# Patient Record
Sex: FEMALE | Race: OTHER | NOT HISPANIC OR LATINO | URBAN - METROPOLITAN AREA
[De-identification: names, ages, dates, MRNs, and addresses within clinical notes are randomized per-mention and may not be internally consistent; named-entity substitution may affect disease eponyms.]

---

## 2020-11-10 ENCOUNTER — EMERGENCY (EMERGENCY)
Facility: HOSPITAL | Age: 20
LOS: 1 days | Discharge: OTHER INSTITUTION | End: 2020-11-10
Attending: EMERGENCY MEDICINE
Payer: COMMERCIAL

## 2020-11-10 VITALS
SYSTOLIC BLOOD PRESSURE: 118 MMHG | RESPIRATION RATE: 17 BRPM | WEIGHT: 130.07 LBS | OXYGEN SATURATION: 98 % | HEART RATE: 82 BPM | HEIGHT: 65 IN | TEMPERATURE: 99 F | DIASTOLIC BLOOD PRESSURE: 69 MMHG

## 2020-11-10 DIAGNOSIS — F12.10 CANNABIS ABUSE, UNCOMPLICATED: ICD-10-CM

## 2020-11-10 DIAGNOSIS — F32.9 MAJOR DEPRESSIVE DISORDER, SINGLE EPISODE, UNSPECIFIED: ICD-10-CM

## 2020-11-10 LAB
ALBUMIN SERPL ELPH-MCNC: 4.6 G/DL — SIGNIFICANT CHANGE UP (ref 3.3–5.2)
ALP SERPL-CCNC: 57 U/L — SIGNIFICANT CHANGE UP (ref 40–120)
ALT FLD-CCNC: 20 U/L — SIGNIFICANT CHANGE UP
AMPHET UR-MCNC: NEGATIVE — SIGNIFICANT CHANGE UP
ANION GAP SERPL CALC-SCNC: 13 MMOL/L — SIGNIFICANT CHANGE UP (ref 5–17)
ANISOCYTOSIS BLD QL: SLIGHT — SIGNIFICANT CHANGE UP
APPEARANCE UR: CLEAR — SIGNIFICANT CHANGE UP
AST SERPL-CCNC: 36 U/L — HIGH
BACTERIA # UR AUTO: ABNORMAL
BARBITURATES UR SCN-MCNC: NEGATIVE — SIGNIFICANT CHANGE UP
BASOPHILS # BLD AUTO: 0.05 K/UL — SIGNIFICANT CHANGE UP (ref 0–0.2)
BASOPHILS NFR BLD AUTO: 0.9 % — SIGNIFICANT CHANGE UP (ref 0–2)
BENZODIAZ UR-MCNC: NEGATIVE — SIGNIFICANT CHANGE UP
BILIRUB SERPL-MCNC: 0.6 MG/DL — SIGNIFICANT CHANGE UP (ref 0.4–2)
BILIRUB UR-MCNC: NEGATIVE — SIGNIFICANT CHANGE UP
BUN SERPL-MCNC: 10 MG/DL — SIGNIFICANT CHANGE UP (ref 8–20)
CALCIUM SERPL-MCNC: 9.8 MG/DL — SIGNIFICANT CHANGE UP (ref 8.6–10.2)
CHLORIDE SERPL-SCNC: 100 MMOL/L — SIGNIFICANT CHANGE UP (ref 98–107)
CO2 SERPL-SCNC: 26 MMOL/L — SIGNIFICANT CHANGE UP (ref 22–29)
COCAINE METAB.OTHER UR-MCNC: NEGATIVE — SIGNIFICANT CHANGE UP
COLOR SPEC: YELLOW — SIGNIFICANT CHANGE UP
CREAT SERPL-MCNC: 0.66 MG/DL — SIGNIFICANT CHANGE UP (ref 0.5–1.3)
DIFF PNL FLD: NEGATIVE — SIGNIFICANT CHANGE UP
EOSINOPHIL # BLD AUTO: 0.09 K/UL — SIGNIFICANT CHANGE UP (ref 0–0.5)
EOSINOPHIL NFR BLD AUTO: 1.7 % — SIGNIFICANT CHANGE UP (ref 0–6)
EPI CELLS # UR: SIGNIFICANT CHANGE UP
ETHANOL SERPL-MCNC: <10 MG/DL — HIGH (ref 0–9)
GIANT PLATELETS BLD QL SMEAR: PRESENT — SIGNIFICANT CHANGE UP
GLUCOSE SERPL-MCNC: 82 MG/DL — SIGNIFICANT CHANGE UP (ref 70–99)
GLUCOSE UR QL: NEGATIVE MG/DL — SIGNIFICANT CHANGE UP
HCG SERPL-ACNC: <4 MIU/ML — SIGNIFICANT CHANGE UP
HCT VFR BLD CALC: 41.8 % — SIGNIFICANT CHANGE UP (ref 34.5–45)
HGB BLD-MCNC: 14.5 G/DL — SIGNIFICANT CHANGE UP (ref 11.5–15.5)
KETONES UR-MCNC: ABNORMAL
LEUKOCYTE ESTERASE UR-ACNC: ABNORMAL
LYMPHOCYTES # BLD AUTO: 1.39 K/UL — SIGNIFICANT CHANGE UP (ref 1–3.3)
LYMPHOCYTES # BLD AUTO: 27.2 % — SIGNIFICANT CHANGE UP (ref 13–44)
MANUAL SMEAR VERIFICATION: SIGNIFICANT CHANGE UP
MCHC RBC-ENTMCNC: 27.6 PG — SIGNIFICANT CHANGE UP (ref 27–34)
MCHC RBC-ENTMCNC: 34.7 GM/DL — SIGNIFICANT CHANGE UP (ref 32–36)
MCV RBC AUTO: 79.6 FL — LOW (ref 80–100)
METHADONE UR-MCNC: NEGATIVE — SIGNIFICANT CHANGE UP
MICROCYTES BLD QL: SLIGHT — SIGNIFICANT CHANGE UP
MONOCYTES # BLD AUTO: 0.4 K/UL — SIGNIFICANT CHANGE UP (ref 0–0.9)
MONOCYTES NFR BLD AUTO: 7.9 % — SIGNIFICANT CHANGE UP (ref 2–14)
NEUTROPHILS # BLD AUTO: 2.95 K/UL — SIGNIFICANT CHANGE UP (ref 1.8–7.4)
NEUTROPHILS NFR BLD AUTO: 57.9 % — SIGNIFICANT CHANGE UP (ref 43–77)
NITRITE UR-MCNC: NEGATIVE — SIGNIFICANT CHANGE UP
OPIATES UR-MCNC: NEGATIVE — SIGNIFICANT CHANGE UP
OVALOCYTES BLD QL SMEAR: SLIGHT — SIGNIFICANT CHANGE UP
PCP SPEC-MCNC: SIGNIFICANT CHANGE UP
PCP UR-MCNC: NEGATIVE — SIGNIFICANT CHANGE UP
PH UR: 6.5 — SIGNIFICANT CHANGE UP (ref 5–8)
PLAT MORPH BLD: NORMAL — SIGNIFICANT CHANGE UP
PLATELET # BLD AUTO: 266 K/UL — SIGNIFICANT CHANGE UP (ref 150–400)
POLYCHROMASIA BLD QL SMEAR: SLIGHT — SIGNIFICANT CHANGE UP
POTASSIUM SERPL-MCNC: 4.2 MMOL/L — SIGNIFICANT CHANGE UP (ref 3.5–5.3)
POTASSIUM SERPL-SCNC: 4.2 MMOL/L — SIGNIFICANT CHANGE UP (ref 3.5–5.3)
PROT SERPL-MCNC: 8.7 G/DL — SIGNIFICANT CHANGE UP (ref 6.6–8.7)
PROT UR-MCNC: 100 MG/DL
RBC # BLD: 5.25 M/UL — HIGH (ref 3.8–5.2)
RBC # FLD: 13.4 % — SIGNIFICANT CHANGE UP (ref 10.3–14.5)
RBC BLD AUTO: ABNORMAL
RBC CASTS # UR COMP ASSIST: SIGNIFICANT CHANGE UP /HPF (ref 0–4)
SALICYLATES SERPL-MCNC: <0.6 MG/DL — LOW (ref 10–20)
SARS-COV-2 RNA SPEC QL NAA+PROBE: DETECTED
SODIUM SERPL-SCNC: 139 MMOL/L — SIGNIFICANT CHANGE UP (ref 135–145)
SP GR SPEC: 1.01 — SIGNIFICANT CHANGE UP (ref 1.01–1.02)
THC UR QL: POSITIVE
UROBILINOGEN FLD QL: 1 MG/DL
VARIANT LYMPHS # BLD: 4.4 % — SIGNIFICANT CHANGE UP (ref 0–6)
WBC # BLD: 5.1 K/UL — SIGNIFICANT CHANGE UP (ref 3.8–10.5)
WBC # FLD AUTO: 5.1 K/UL — SIGNIFICANT CHANGE UP (ref 3.8–10.5)
WBC UR QL: SIGNIFICANT CHANGE UP

## 2020-11-10 PROCEDURE — 80053 COMPREHEN METABOLIC PANEL: CPT

## 2020-11-10 PROCEDURE — 85025 COMPLETE CBC W/AUTO DIFF WBC: CPT

## 2020-11-10 PROCEDURE — 90792 PSYCH DIAG EVAL W/MED SRVCS: CPT

## 2020-11-10 PROCEDURE — 93010 ELECTROCARDIOGRAM REPORT: CPT

## 2020-11-10 PROCEDURE — 99285 EMERGENCY DEPT VISIT HI MDM: CPT

## 2020-11-10 PROCEDURE — G0378: CPT

## 2020-11-10 PROCEDURE — 80307 DRUG TEST PRSMV CHEM ANLYZR: CPT

## 2020-11-10 PROCEDURE — 81001 URINALYSIS AUTO W/SCOPE: CPT

## 2020-11-10 PROCEDURE — 84702 CHORIONIC GONADOTROPIN TEST: CPT

## 2020-11-10 PROCEDURE — 93005 ELECTROCARDIOGRAM TRACING: CPT

## 2020-11-10 PROCEDURE — 36415 COLL VENOUS BLD VENIPUNCTURE: CPT

## 2020-11-10 PROCEDURE — U0003: CPT

## 2020-11-10 PROCEDURE — 99218: CPT

## 2020-11-10 NOTE — ED BEHAVIORAL HEALTH ASSESSMENT NOTE - SUMMARY
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  Pt reports depressive sx's over the last year that have worsened over the last 3-4 weeks with onset of passive suicidal ideation and thoughts about falling from a great height.  She denies active ideation intent or plan, but feels she is not functioning and spending excessive time in been with frequent crying spells.  Mother reports that she disclosed to father that she had suicidal thoughts which she has never done before.  Patients mood symptoms and lack of motivation are complicated by daily cannabis use which may be contributory.  Pt is seeking voluntary hospitalization and would benefit from inpatient hospitalization for safety and management of symptoms.

## 2020-11-10 NOTE — ED ADULT NURSE NOTE - OBJECTIVE STATEMENT
Patient presented in  area in yellow gowns.  Patient reports she has been depressed and agreed to come to the ED on advice from her parents.  Patient reports she has worsening depression since COVID started.  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  Patient dropped out of college in fall due to difficultly with online classes.   Patient has no past treatment or hospitalizations but tried to establish therapy through school before dropping out.  Patient admits to daily marijuana use.  no history of suicide attempts but does wish she would not wake up in the morning.  Cooperative with security contraband assessment and securing belongings in  locker.

## 2020-11-10 NOTE — ED CDU PROVIDER INITIAL DAY NOTE - DETAILS
PT. with depression and suicidal thoughts. Pt. will require inpatient psychiatric admission(voluntary).

## 2020-11-10 NOTE — ED BEHAVIORAL HEALTH ASSESSMENT NOTE - RISK ASSESSMENT
Patient at elated risk secondary to depressive sx's, passive suicidal ideation, lack of future orientation, fantasies about dying, denies strong deterrants against death, has been smoking cannabis daily, is seeking help denies psychotic sx's, reports h/o prior triggered panic attacks. Moderate Acute Suicide Risk

## 2020-11-10 NOTE — ED PROVIDER NOTE - CHPI ED SYMPTOMS NEG
no disorientation/no homicidal/no change in level of consciousness/no hallucinations/no weight loss/no paranoia/no weakness

## 2020-11-10 NOTE — ED ADULT TRIAGE NOTE - CHIEF COMPLAINT QUOTE
Pt. arrives to ED for wanting psych eval.  Pt states "I don't want to hurt myself but I want to die."  Pt. denies plan to hurt self.  Pt. denies HI/visual or auditory hallucinations at this time.  Pt. denies pmh.  Pt. changed into yellow gown and belongings secured for safety.

## 2020-11-10 NOTE — ED PROVIDER NOTE - OBJECTIVE STATEMENT
20 year old female denies PHX brought in by family for worsening depression and suicidal thoughts. PT states has had increased depression, sleeping excessively since going through a breakup with ex-boyfriend 1 year ago, Sunday morning got into a verbal altercation with father at home and Pt stated "she just wanted to die". Pt currently expressing she does not want to hurt herself but wishes she was dead sometimes. Denies plan or intent to hurt self, HI, AH, VH. Endorses intermittent ETOH use, daily Marijuana use. Last saw therapist February 2020, not ever on psychiatric medication. Denies any medical complaints at this time.

## 2020-11-10 NOTE — ED CDU PROVIDER INITIAL DAY NOTE - CHPI ED SYMPTOMS NEG
no hallucinations/no weakness/no paranoia/no change in level of consciousness/no weight loss/no disorientation/no homicidal

## 2020-11-10 NOTE — ED ADULT NURSE NOTE - NS TRANSFER PATIENT BELONGINGS
Please call to make sure patient get the results.  Persistent hematuria.  Refer to Urology. Clothing

## 2020-11-10 NOTE — ED BEHAVIORAL HEALTH ASSESSMENT NOTE - OTHER
waiting for Covid testing passive suicidal ideation, not functioning, endorsing suicidal ideation to family, worsening depressive sx's. billing in PK

## 2020-11-10 NOTE — ED BEHAVIORAL HEALTH ASSESSMENT NOTE - DESCRIPTION (FIRST USE, LAST USE, QUANTITY, FREQUENCY, DURATION)
reports occasional binge drinking-last time was halloween reports daily use since age 15. last period of sobriety was reportedly several weeks in January of this yeare

## 2020-11-10 NOTE — ED BEHAVIORAL HEALTH ASSESSMENT NOTE - DETAILS
NA see HPI grandmother w/ late history of paranoia, uncle on mother's side with h/o suicidality and psychiatric hospitalization. h/o cocaine, cannabis and ETOH use on mother's side of the family. No known substance use by parents as above spoke with mother

## 2020-11-10 NOTE — ED PROVIDER NOTE - PROGRESS NOTE DETAILS
Pt. will be a voluntary admission for inpatient -psychiatric treatment. Pt. is medically stable. Awaiting for an accepting facility. Pt. will be placed in virtual observation.

## 2020-11-10 NOTE — ED BEHAVIORAL HEALTH ASSESSMENT NOTE - DESCRIPTION
Patient laying in bed. Reports passive suicidal ideation but denies active suicidal ideation at this time and denies h/o prior suicide attempts.  She is not in any physical distress. Pt appeared forthcoming, and spoke in mostly monotone voice with constricted affect.      Vital Signs Last 24 Hrs  T(C): 37.2 (10 Nov 2020 12:53), Max: 37.2 (10 Nov 2020 12:53)  T(F): 98.9 (10 Nov 2020 12:53), Max: 98.9 (10 Nov 2020 12:53)  HR: 82 (10 Nov 2020 12:53) (82 - 82)  BP: 118/69 (10 Nov 2020 12:53) (118/69 - 118/69)  BP(mean): --  RR: 17 (10 Nov 2020 12:53) (17 - 17)  SpO2: 98% (10 Nov 2020 12:53) (98% - 98%) Denies Lives with parents with younger brother. Dropped out of College in September.  Currently employed

## 2020-11-10 NOTE — ED BEHAVIORAL HEALTH ASSESSMENT NOTE - HPI (INCLUDE ILLNESS QUALITY, SEVERITY, DURATION, TIMING, CONTEXT, MODIFYING FACTORS, ASSOCIATED SIGNS AND SYMPTOMS)
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        Pt reports that she was brought in by parents because they noted she was depressed.  Patient admits that she has been depressed for the last year but has been worsening over the alst 6-8 monts and worse in the last 3-4 weeks.  She states that she dropped out of school in September and cites the Pandemic as a stressor. She also reports poor relationship with her parents and feels angry at them but is not able to give a good explanation as to why. She also reports daily cannabis use since age 15.   She reports that is used to make her feel anxious and still does at times, but feels that it often calms her.      Patient states that for the last several weeks she has been feeling consistently depressed. She reports she often cries in the morning, and stays in bed.  She has been going to work which she finds does not take much effort. She states that her appetite is low and friends have noticed she has lost weight in the last month.   She denies disturbance in sleep. She feels she is less motivated and has not been drawing recently which is something she ususally likes to do.  She feels her energy is los most of the time and has long standing difficulties concentrating. She reports feeling guilty that she "does not have her life together" and has been having thoughts that "I would love to die" recently over the last month.  She denies any intent or plan. she does states that she thoughts about "falling from a great height" but would not do it because she wants to be "beautiful" when she dies and is afraid of pain.  She denies any thoughts about the future.  She does not feel that she would try to end her life but feels she is not functioning and needs help.   Tx options were explored and she feels she needs to be in the hospital to get help.     Concerning other psychiatric symptoms, pt denies  any aggressive or violent behavior towards others. Pt denies any episodes of bizarre happiness, unusual energy, unusual nightime excitation or other common symptoms of [place]. Pt denies hearing voices or seeing things.  No delusions were elicited.   Reports more anxious recently reports h/o triggered panic attacks and last time was about a month ago. She denies any other substance use.         Writer called mother.  Mother reports that pt's behavior in the last year appears more regressed.  She can have mood swings and be easily irritated.  Mother states pt has been spending a lot of time in bed and staying in room and not come out.  Mother reports pt has not been very communicative.  She reports that after  tried speaking to her over the weekend she endorsed that she wanted to commit suicide.  Mother believe she  is  depressed.  She notes patient is coming she states that patient "shuts her out" and its hard to know what is going on with her.  She states patient dropped out of school a month after starting. She does not know of patient engaging in any dangerous behavior.  She is concerned about her safety and that is the reason she brought her in.  She has never heard her say that she wanted to kill herself before.   She found out that patient has been smoking. She reports family has h/o substance use in uncles (cannabis and cocaine) and reports that one of the brothers has h/o suicidal thoughts and h/o psychiatic admission.

## 2020-11-10 NOTE — ED BEHAVIORAL HEALTH ASSESSMENT NOTE - SUICIDE RISK FACTORS
Anhedonia/Hopelessness or despair/Alcohol/Substance abuse disorders/Current mood episode/Family History of Suicidal behavior

## 2020-11-10 NOTE — ED CDU PROVIDER INITIAL DAY NOTE - MEDICAL DECISION MAKING DETAILS
Pt. with depression/suicidal thoughts. Pt. awaiting placement for inpatient psychiatry unit. Pt. in observation unit.

## 2020-11-10 NOTE — ED PROVIDER NOTE - CLINICAL SUMMARY MEDICAL DECISION MAKING FREE TEXT BOX
20 year old female with no PHX brought in by family after verbal altercation Sunday for worsening depression, anhedonia, and suicidal ideations without plan or injurious behavior.   Will check labs, EKG, urine, consult psychiatry and reassess patient.

## 2020-11-10 NOTE — ED PROVIDER NOTE - PHYSICAL EXAMINATION
General: Non- toxic, tearful, A&Ox4, ambulatory by self  Eyes: PERRLA, EOM intact bilaterally with no nystagmus  ENMT: Airway patent, Nasal mucosa clear. Mouth with normal mucosa. Throat has no vesicles, no oropharyngeal exudates and uvula is midline.  Cardiac: RRR, S1/ S2 with no murmurs, rubs or gallops.   Respiratory: Breath sounds equal bilateral with no adventitious sounds, respirations even and unlabored.   GI: ABD soft, non tender, non distended, bowel sounds present x 4 quadrants.  Neuro: A&Ox4, no focal neurological deficits  Psych: Suicidal ideation without thoughts of self harm or self injurious behavior. No auditory or visual hallucinations. no delusions or flight of ideas. Depressed mood, Normal Affect, normal speech.

## 2020-11-10 NOTE — ED BEHAVIORAL HEALTH ASSESSMENT NOTE - OTHER PAST PSYCHIATRIC HISTORY (INCLUDE DETAILS REGARDING ONSET, COURSE OF ILLNESS, INPATIENT/OUTPATIENT TREATMENT)
Reports she had two appointments with therapist in February for depression but did not follow up after Pandemic.  She denies prior psychiatric hospitalizations. No prior trials of psychotropic medications. She denies h/o suicide attempts.  No other h/o psychiatric treatment

## 2020-11-11 VITALS
HEART RATE: 76 BPM | TEMPERATURE: 99 F | RESPIRATION RATE: 20 BRPM | OXYGEN SATURATION: 98 % | DIASTOLIC BLOOD PRESSURE: 64 MMHG | SYSTOLIC BLOOD PRESSURE: 108 MMHG

## 2020-11-11 PROCEDURE — 99214 OFFICE O/P EST MOD 30 MIN: CPT

## 2020-11-11 PROCEDURE — 99217: CPT

## 2020-11-11 NOTE — PROGRESS NOTE BEHAVIORAL HEALTH - NSBHFUPINTERVALHXFT_PSY_A_CORE
She continues to reports depressive sx's, with passive suicidal ideation, feels that she is not functioning.  She is COVID positive but denies any physical symptoms.  Signed voluntary paper work for hospitalization.

## 2020-11-11 NOTE — ED ADULT NURSE REASSESSMENT NOTE - NS ED NURSE REASSESS COMMENT FT1
Notified by Charge nurse patient is positive covid and will be moved to A-5
Pt with no complaints relaxing watching TV, dinner supplied to pt and pt states she needs nothing at this time.
Verbal report given to ANA Vargas and patient transported to - for further care
recvd patient from NH, Pt in no apparent distress at this time. Airway patent, breathing spontaneous and nonlabored. Pt A&Ox3 resting in stretcher. Pt with no complaints, good eye contact and conversing appropriately. pt on 1:1 for SI.
Assumed care of patient.  patient alert and cooperative. Pt denies any complaints.  Will monitor and chart changes and maintain safe environment
Patient mood remains depressed.  Patient denies suicidal or homicidal ideation.  Patient continues to endorse desire to inpatient hospitalization.  Patient ate 100% of her meal.  Safety of patient maintained.

## 2020-11-11 NOTE — PROGRESS NOTE BEHAVIORAL HEALTH - NSBHCONSULTFOLLOWDETAILS_PSY_A_CORE FT
Plan to transfer to [facility].  Patient signed voluntary paperwork

## 2020-11-11 NOTE — ED CDU PROVIDER SUBSEQUENT DAY NOTE - HISTORY
Pt signed out to me by dr. Marion pending psych placement.  Pt subsequently found to be covid positive. Pt with no sob. not hypoxic. no reason for admission for covid. no other issues.

## 2020-11-11 NOTE — CHART NOTE - NSCHARTNOTEFT_GEN_A_CORE
SW Note: Per psych team, pt would benefit from inpt psych trx on voluntary basis. 9.13 legals completed and on chart. Pt's covid swab has resulted, pt is Covid POSITIVE. Pt accepted to [facility]- Covid Isolation unit for inpt psych by Dr. Zuleta. Per [facility] Admissions, EMS is to bring pt to [facility], and coordinate with their security safely bringing pt to unit [place] [facility].     SW arranged via [facility] ([facility]), Pt aware of trx for today. Complete trx packet left on unit. Pt has [employer] HMO, SW will f/u for auth. No further acute SW services identified at this time

## 2022-03-24 NOTE — ED BEHAVIORAL HEALTH ASSESSMENT NOTE - NS ED BHA DEMOGRAPHICS MEDICAL RECORD REVIEWED YES RECORDS
Detail Level: Detailed Quality 137: Melanoma: Continuity Of Care - Recall System: Patient information entered into a recall system that includes: target date for the next exam specified AND a process to follow up with patients regarding missed or unscheduled appointments Quality 130: Documentation Of Current Medications In The Medical Record: Current Medications Documented Quality 226: Preventive Care And Screening: Tobacco Use: Screening And Cessation Intervention: Patient screened for tobacco use and is an ex/non-smoker Quality 111:Pneumonia Vaccination Status For Older Adults: Pneumococcal Vaccination Previously Received Quality 265: Biopsy Follow-Up: Biopsy results reviewed, communicated, tracked, and documented Quality 110: Preventive Care And Screening: Influenza Immunization: Influenza Immunization Administered during Influenza season Quality 138: Melanoma: Coordination Of Care: Treatment plan not communicated, reason not otherwise specified. Quality 431: Preventive Care And Screening: Unhealthy Alcohol Use - Screening: Patient screened for unhealthy alcohol use using a single question and scores less than 2 times per year Quality 47: Advance Care Plan: Advance care planning not documented, reason not otherwise specified. Hospital chart Quality 431: Preventive Care And Screening: Unhealthy Alcohol Use - Screening: Patient not identified as an unhealthy alcohol user when screened for unhealthy alcohol use using a systematic screening method

## 2022-04-29 NOTE — PROGRESS NOTE BEHAVIORAL HEALTH - NS ED BHA REVIEW OF ED CHART AVAILABLE IMAGING REVIEWED
None available PAST MEDICAL HISTORY:  COVID 12/21    GERD (gastroesophageal reflux disease)     HTN (hypertension)     Hypothyroidism     Mass of ovary     Ovarian cancer S/P [facility]-BSO no chemo or radiation  2016    Pleural effusion right lung, 1984    Right knee pain

## 2022-06-16 NOTE — ED ADULT TRIAGE NOTE - WEIGHT METHOD

## 2023-04-19 NOTE — ED ADULT NURSE NOTE - ABNORMAL MOVEMENTS
Detail Level: Simple Instructions: This plan will send the code FBSE to the PM system.  DO NOT or CHANGE the price. Price (Do Not Change): 0.00 No abnormal movements

## 2025-06-09 NOTE — ED CDU PROVIDER INITIAL DAY NOTE - PSYCHIATRIC LEVEL OF CONSCIOUSNESS
Addended by: MYNOR MONTANO on: 6/9/2025 11:41 AM     Modules accepted: Orders     alert/follows commands